# Patient Record
Sex: MALE | Race: WHITE
[De-identification: names, ages, dates, MRNs, and addresses within clinical notes are randomized per-mention and may not be internally consistent; named-entity substitution may affect disease eponyms.]

---

## 2017-12-02 NOTE — EDPHY
H & P


Stated Complaint: Full Trauma, Fall


Time Seen by Provider: 12/02/17 22:52


HPI/ROS: 





Chief Complaint:  Fall, unresponsive





HPI:  22-year-old presumably intoxicated male was witnessed to trip and fall 

off of a 3 ft retaining wall, landed forward on his head on the concrete.  EMS 

was called.  On arrival the patient was unresponsive.  He was not responding to 

noxious stimuli.  Bystanders stated that he had been drinking alcohol.  No 

obvious head trauma per EMS.  Patient had normal heart rate and vital signs per 

EMS and was breathing on his own.  Remainder of history is unavailable 

secondary to the patient's decreased responsiveness.





ROS:  Unavailable secondary to the patient's decreased responsiveness.





PMH:  Unknown





Social History:  Unknown


Family History: non-contributory





Physical Exam:


Gen:  Unresponsive, Airway Intact, patient is localizing to pain, making 

incomprehensible sounds, smells of alcohol, GCS 7


HEENT:


     Head: Atraumatic


     Eyes:  Pupils 6-4 bilaterally, equally reactive in brisk


     Ears: No hemotympanum


     Nose: No epistaxis


     Mouth: Normal dentition, Airway patent


     Face: No deformity


Neck: non-tender, no stepoff


Chest:  non-tender, lungs CTA


Heart: normal heart tones


Abd: soft, abrasion over the left iliac crest


Pelvis: stable to AP and Lateral compression


Back: atraumatic, no midline tenderness


Ext: atramatic, full ROM


Skin: no rash


Neuro:  Moving all extremities

















- Personal History


Current Tetanus/Diphtheria Vaccine: Unsure


Current Tetanus Diphtheria and Acellular Pertussis (TDAP): Unsure





- Medical/Surgical History


Other PMH: unobtainable.





- Social History


Smoking Status: Unknown if ever smoked


Constitutional: 


 Initial Vital Signs











Temperature (C)  36.4 C   12/02/17 22:41


 


Heart Rate  101 H  12/02/17 22:41


 


Respiratory Rate  22 H  12/02/17 22:41


 


Blood Pressure  110/82 H  12/02/17 22:41


 


O2 Sat (%)  100   12/02/17 22:41








 











O2 Delivery Mode               Non-Rebreather Mask


 


O2 (L/minute)                  15














Allergies/Adverse Reactions: 


 





Unable to Assess Allergy (Unverified 02/02/16 15:59)


 








Home Medications: 














 Medication  Instructions  Recorded


 


Unobtainable  02/02/16














Medical Decision Making





- Diagnostics


EKG Interpretation: 





ECG time 11:37 p.m., sinus rhythm with a rate of 87, normal axis, there is J-

point elevation diffusely consistent with early repolarization.  Impression:  

Normal ECG.


Imaging Results: 


 Imaging Impressions





Cervical Spine CT  12/02/17 22:43


Impression: Negative noncontrast CT of the cervical spine for acute traumatic 

injury.


 


Results reviewed at the console at the time of the study..








Head CT  12/02/17 22:43


Impression:   Small focus of parenchymal hemorrhage involving the high right 

parietal subcortical white matter. 


 


Results reviewed directly at the time of the scan.








Abdomen CT  12/02/17 22:46


Impression:  No acute posttraumatic findings in the chest abdomen and pelvis. 


 


Examination reviewed at the console the time of study acquisition.


 


 


 








Chest CT  12/02/17 22:46


Impression:  No acute posttraumatic findings in the chest abdomen and pelvis. 


 


Examination reviewed at the console the time of study acquisition.


 


 


 











Procedures: 





Procedure:  Trauma ultrasound.





Limited echocardiogram for pericardial effusion.


Limited bedside ultrasound was performed and interpreted by myself for the 

indication of:  thoracoabdominal trauma utilizing the thoracoabdominal 

emergency ultrasound protocol.


Limited transthoracic echocardiogram:  The pericardium was visualized and found 

to be negative for pericardial fluid.


The study was negative for pericardial effusion.


Limited abdominal ultrasound for blunt abdominal trauma.


1) The right upper quadrant was visualized and was found to be negative for 

intraperitoneal fluid.


2) The left upper quadrant was visualized and found to be negative for 

intraperitoneal fluid.


The study was felt to be negative for free intraperitoneal fluid.





Limited pelvic ultrasound was conducted for abdominal trauma.


The bladder was visualized and did not reveal an anechoic area outside of the 

adjacent urinary bladder.


The study was felt to be negative for free intraperitoneal fluid.





Limited transthoracic ultrasound for PE shows the bilateral normal string of 

pearls sign with normal a.m. mode bilaterally, no evidence of pneumothorax.


ED Course/Re-evaluation: 





Patient arrived as a full trauma team activation.  No overt initial vital signs 

were unremarkable.  No obvious signs of head trauma.  Pupils reactive.  Patient 

smelled of alcohol.  Dr. Sim present at the bedside on patient arrival.  

Patient underwent primary and secondary survey and E fast scan.  Then proceeded 

directly to CT scanning.





CT scan of the head noted for a right parenchymal bleed.





Patient's blood alcohol 320.





Dr. Sim has arranged for transfer to [facility] for further care.  There 

are no ICU beds available in this hospital at this time.





- Data Points


Laboratory Results: 


 Laboratory Results





 12/02/17 22:47 





 12/02/17 22:47 





 











  12/02/17 12/02/17 12/02/17





  22:47 22:47 22:47


 


WBC      





    


 


RBC      





    


 


Hgb      





    


 


POC Hgb      





    


 


Hct      





    


 


POC Hct      





    


 


MCV      





    


 


MCH      





    


 


MCHC      





    


 


RDW      





    


 


Plt Count      





    


 


MPV      





    


 


Neut % (Auto)      





    


 


Lymph % (Auto)      





    


 


Mono % (Auto)      





    


 


Eos % (Auto)      





    


 


Baso % (Auto)      





    


 


Nucleat RBC Rel Count      





    


 


Absolute Neuts (auto)      





    


 


Absolute Lymphs (auto)      





    


 


Absolute Monos (auto)      





    


 


Absolute Eos (auto)      





    


 


Absolute Basos (auto)      





    


 


Absolute Nucleated RBC      





    


 


Immature Gran %      





    


 


Immature Gran #      





    


 


PT      12.5 SEC SEC





     (12.0-15.0) 


 


INR      0.91 





     (0.83-1.16) 


 


APTT      25.5 SEC SEC





     (23.0-38.0) 


 


POC Sodium      





    


 


Sodium    144 mEq/L mEq/L  





    (134-144)  


 


POC Potassium      





    


 


Potassium    3.8 mEq/L mEq/L  





    (3.5-5.2)  


 


POC Chloride      





    


 


Chloride    105 mEq/L mEq/L  





    ()  


 


Carbon Dioxide    24 mEq/l mEq/l  





    (22-31)  


 


Anion Gap    15 mEq/L mEq/L  





    (8-16)  


 


POC BUN      





    


 


BUN    17 mg/dL mg/dL  





    (7-23)  


 


Creatinine    1.0 mg/dL mg/dL  





    (0.7-1.3)  


 


POC Creatinine      





    


 


Estimated GFR    > 60   





    


 


Glucose    97 mg/dL mg/dL  





    ()  


 


POC Glucose      





    


 


Calcium    8.9 mg/dL mg/dL  





    (8.5-10.4)  


 


Ethyl Alcohol    328 mg/dL H mg/dL  





    (0-10)  


 


Patient ABO/Rh  A POSITIVE     





    


 


Antibody Screen  NEGATIVE     





    














  12/02/17 12/02/17





  22:47 22:42


 


WBC  5.42 10^3/uL 10^3/uL  





   (3.80-9.50)  


 


RBC  4.57 10^6/uL 10^6/uL  





   (4.40-6.38)  


 


Hgb  14.3 g/dL g/dL  





   (13.7-17.5)  


 


POC Hgb    15.3 gm/dL gm/dL





    (13.7-17.5) 


 


Hct  40.8 % %  





   (40.0-51.0)  


 


POC Hct    45 % %





    (40-51) 


 


MCV  89.3 fL fL  





   (81.5-99.8)  


 


MCH  31.3 pg pg  





   (27.9-34.1)  


 


MCHC  35.0 g/dL g/dL  





   (32.4-36.7)  


 


RDW  12.7 % %  





   (11.5-15.2)  


 


Plt Count  211 10^3/uL 10^3/uL  





   (150-400)  


 


MPV  9.9 fL fL  





   (8.7-11.7)  


 


Neut % (Auto)  51.3 % %  





   (39.3-74.2)  


 


Lymph % (Auto)  37.3 % %  





   (15.0-45.0)  


 


Mono % (Auto)  9.4 % %  





   (4.5-13.0)  


 


Eos % (Auto)  1.1 % %  





   (0.6-7.6)  


 


Baso % (Auto)  0.7 % %  





   (0.3-1.7)  


 


Nucleat RBC Rel Count  0.0 % %  





   (0.0-0.2)  


 


Absolute Neuts (auto)  2.78 10^3/uL 10^3/uL  





   (1.70-6.50)  


 


Absolute Lymphs (auto)  2.02 10^3/uL 10^3/uL  





   (1.00-3.00)  


 


Absolute Monos (auto)  0.51 10^3/uL 10^3/uL  





   (0.30-0.80)  


 


Absolute Eos (auto)  0.06 10^3/uL 10^3/uL  





   (0.03-0.40)  


 


Absolute Basos (auto)  0.04 10^3/uL 10^3/uL  





   (0.02-0.10)  


 


Absolute Nucleated RBC  0.00 10^3/uL 10^3/uL  





   (0-0.01)  


 


Immature Gran %  0.2 % %  





   (0.0-1.1)  


 


Immature Gran #  0.01 10^3/uL 10^3/uL  





   (0.00-0.10)  


 


PT    





   


 


INR    





   


 


APTT    





   


 


POC Sodium    142 mEq/L mEq/L





    (134-144) 


 


Sodium    





   


 


POC Potassium    3.5 mEq/L mEq/L





    (3.3-5.0) 


 


Potassium    





   


 


POC Chloride    105 mEq/L mEq/L





    () 


 


Chloride    





   


 


Carbon Dioxide    





   


 


Anion Gap    





   


 


POC BUN    16 mg/dL mg/dL





    (7-23) 


 


BUN    





   


 


Creatinine    





   


 


POC Creatinine    1.5 mg/dL H mg/dL





    (0.7-1.3) 


 


Estimated GFR    





   


 


Glucose    





   


 


POC Glucose    99 mg/dL mg/dL





    () 


 


Calcium    





   


 


Ethyl Alcohol    





   


 


Patient ABO/Rh    





   


 


Antibody Screen    





   











Medications Given: 


 








Discontinued Medications





Sodium Chloride (Ns)  1,000 mls @ 0 mls/hr IV ONCE ONE; Wide Open


   PRN Reason: Protocol


   Stop: 12/02/17 23:00


   Last Admin: 12/02/17 23:33 Dose:  1,000 mls


Ondansetron HCl (Zofran)  4 mg IVP EDNOW ONE


   Stop: 12/02/17 23:00


   Last Admin: 12/02/17 22:48 Dose:  4 mg





Point of Care Test Results: 


 











  12/02/17





  22:42


 


POC Sodium  142


 


POC Potassium  3.5


 


POC Chloride  105


 


POC BUN  16


 


POC Creatinine  1.5 H


 


POC Glucose  99














Departure





- Departure


Disposition: Acute Care [facility]


Clinical Impression: 


 Intraparenchymal hemorrhage of brain, Alcohol intoxication





Condition: Serious


Referrals: 


Patient,NotPresent [Primary Care Provider] - As per Instructions

## 2017-12-02 NOTE — CPEKG
Heart Rate: 87

RR Interval: 690

P-R Interval: 208

QRSD Interval: 88

QT Interval: 376

QTC Interval: 453

P Axis: 70

QRS Axis: 51

T Wave Axis: 57

EKG Severity - NORMAL ECG -

EKG Impression: SINUS RHYTHM

EKG Impression: ST ELEV, PROBABLE NORMAL EARLY REPOL PATTERN

Electronically Signed By: Aaron Rae 03-Dec-2017 06:29:39

## 2017-12-03 NOTE — GDS
[f 
rep st]



                                                              TRANSFER SUMMARY





HISTORY AND PHYSICAL AND TRANSFER SUMMARY



HISTORY:  The patient is a 22-year-old male who was running and apparently fell 
over 3 foot wall landing on a cement walkway.  He was unresponsive at the scene
, and transported by EMS.  He did not have any vomiting.  On admission, he was 
met in the emergency room by Dr. Rae and Dr. Sim.



PHYSICAL EXAMINATION:  HEENT:  Evaluation of his head and neck first shows that 
his airway is clear, his breathing is unencumbered, and there is no external 
bleeding.  Specifically, regarding his head, his skull is palpably normal.  
There are no raccoon eyes.  There is no Cortes sign.  His jaw appears to be 
stable.  NECK:  He is in a C-collar and he is left in a C-collar due to his 
mental status.  UPPER EXTREMITIES:  His right upper extremity is ranged and 
found to be unremarkable.  His left upper extremity is ranged and found to be 
unremarkable.  His clavicles are palpably normal.  His chest is stable to AP 
and lateral compression.  Breath sounds are equal bilaterally.  CARDIAC:  Shows 
S1, S2 to be normal with normal split of S2, without murmurs, rubs, or gallops.
  ABDOMEN:  Soft, nontender.  There is an umbilical hernia noted.  There is an 
abrasion on his left flank.  His pelvis is stable to AP and lateral 
compression.  LOWER EXTREMITIES:  Ranged and found to be unremarkable.  VITAL 
SIGNS:  On admission, were 110/82, heart rate of 99, respirations 20, 
temperature 36.2.



MEDICATIONS:  Received have been limited to Zofran.



IMAGING:  He was taken to CAT scan.  In CAT scan, the CAT scan of his head 
shows a minimal skin thickening consistent with a contusion.  It shows a small 
intraparenchymal linear bleed in the right posterior parietal/occipital area.  
There is no evidence of shift.  There is no evidence of epidural or subdural 
bleed.  There is a suggestion of a small subarachnoid bleed near the brainstem. 



CT of his neck is unremarkable. 



CT of his chest shows no great vessel injury.  No pneumothorax.  No pulmonary 
contusion.  No hemo or hydrothorax. 



Note is made he did have an extended FAST examination in the ER, which did show 
a full bladder, a normal right kidney with no evidence of fluid in Morison's 
pouch.  Normal left kidney with no fluid between the spleen and the kidney.  
The cardiac exam showed no evidence of pericardial effusion and good 
contraction.  The pulmonary examination did not show any signs of pneumothorax.
  The CAT scan of the abdomen confirmed the above findings.  The CAT scan of 
the pelvis was similarly negative.



LABORATORY DATA:  His coagulation parameters are normal.  His blood alcohol is 
320, as mentioned above.  His hematocrit is 40.8, platelet count is 211.  His 
INR is 0.91.  His chemistries reveal sodium 144, potassium of 3.8, BUN of 17 
and a creatinine of 1.0.  His calcium is 8.9.



HOSPITAL COURSE:  Unfortunately, there are no available ICU beds at this 
facility.  I have contacted [facility] and talked with Dr. Raul Sen, who 
is the trauma surgeon on call, and she will accept the patient in transfer.  He 
is not intubated at this point and appears to be protecting his airway.  Since 
admission, he has become a little more arousable.  He did move his right upper 
extremity for localizing pain (pinch of his left trapezius), but that is the 
only movement we have seen.  An ABG will be obtained to make sure that his 
ventilation is appropriate. A Otto catheter hasd been placed and 300 cc of 
clear yellow urine had been obtained.



PLAN:  At this point, I do not see indication to intubate.  He will be sent by 
critical care ambulance to [facility].





Job #:  [id number]

MTDD

## 2017-12-06 NOTE — ASMTCMCOM
CM Note

 

CM Note                       

Notes:

Case Management asked to assist with making follow up appointment with [facility].  Appointment 


made with Dr. Bolanos at the [place] office for Friday, December 8th at 0915 AM.  I informed patient 

of this appointment and called patient on his cell phone [phone] asking him if I could leave 

a VM with details/address, etc for appointment.  Message left with details as well as CM phone 

number for any other follow up questions/concerns.

 

Date Signed:  12/06/2017 12:04 PM

Electronically Signed By:Floridalma Mccormick RN

## 2017-12-06 NOTE — EDPHY
H & P


Time Seen by Provider: 12/06/17 10:12


HPI/ROS: 





CHIEF COMPLAINT:  Ramos catheter problem





HISTORY OF PRESENT ILLNESS:  22-year-old male presents with problem with his 

Ramos catheter.  He was seen in this emergency department on 12/2/2017 after a 

closed head injury.  He apparently fell off a 3 ft wall and was found 

unresponsive.  During his emergency department evaluation, a Ramos catheter was 

placed.  Apparently the Ramos catheter was inflated within the urethra.  He was 

transferred to [facility] because of unavailability of ICU beds.  He was 

seen by a urologist at [facility].  He continues to have a Ramos catheter in 

place and the plan is for the Ramos catheter to be removed over the weekend.  

The Ramos catheter is pulling on his penis and is also frequently becoming 

disconnected at the junction with his collection bag.  He denies suprapubic 

pain.





REVIEW OF SYSTEMS:


Constitutional:  No fever, no chills


Eyes:  No visual changes


ENT:  No sore throat


Respiratory:  No cough, no shortness of breath


Cardiac:  No chest pain


Gastrointestinal:  No nausea, no vomiting, no abdominal pain


Genitourinary:  no dysuria


Musculoskeletal:  No leg pain or swelling


Skin:  No rash


Neurological:  No headache


Psychiatric:  No anxiety





Past Medical/Surgical History: 





Urethral injury


Alcohol abuse





Social History: 





[employer] student





Smoking Status: Never smoked


Physical Exam: 





General Appearance:  Alert, pleasant


Eyes:  Pupils equal and round, no conjunctival pallor


ENT, Mouth:  Mucous membranes moist


Neck:  Normal inspection


Respiratory:  Lungs are clear to auscultation


Cardiovascular:  Regular rate and rhythm


Gastrointestinal:  Abdomen is soft and nontender


Genitourinary:  Ramos catheter in place, draining yellow urine


Neurological:  A&O, nonfocal, normal gait


Skin:  Warm and dry


Extremities:  normal inspection


Psychiatric:  Mood and affect normal





Constitutional: 


 Initial Vital Signs











Temperature (C)  36.7 C   12/06/17 09:51


 


Heart Rate  106 H  12/06/17 09:51


 


Respiratory Rate  16   12/06/17 09:51


 


Blood Pressure  105/69   12/06/17 09:51


 


O2 Sat (%)  93   12/06/17 09:51








 











O2 Delivery Mode               Room Air














Allergies/Adverse Reactions: 


 





No Known Allergies Allergy (Unverified 12/06/17 09:51)


 








Home Medications: 














 Medication  Instructions  Recorded


 


NK [No Known Home Meds]  12/06/17














Medical Decision Making


ED Course/Re-evaluation: 





The ramos catheter was adjusted by the ED RN.  Dr. Quintero consulted, pt will f/u 

in the office on Friday.








Departure





- Departure


Disposition: Home, Routine, Self-Care


Clinical Impression: 


Ramos catheter problem


Qualifiers:


 Encounter type: initial encounter Qualified Code(s): T83.9XXA - Unspecified 

complication of genitourinary prosthetic device, implant and graft, initial 

encounter





Condition: Good


Instructions:  Ramos Catheter Placement and Care (ED)


Additional Instructions: 


I spoke with Dr. Quintero, the urologist on call.  Follow up in the office on 

Friday.





You have an appointment with Dr. Bolanos at [facility] in [place] on Friday, December 8th at 9:15 AM





[facility]


[address] [address] [phone]





Please bring your insurance card and photo ID to your appointment





Referrals: 


Antione Quintero MD [Medical Doctor] - As per Instructions

## 2017-12-07 NOTE — EDPHY
H & P


Stated Complaint: bleeding around ramos catheter/seen yesterday for same


Time Seen by Provider: 12/07/17 18:40


HPI/ROS: 





Chief complaint:  Penis pain, bleeding from Ramos insertion





History of present illness:  This is a 22-year-old male who returns to the 

emergency room reporting pain in his penis from a Ramos insertion.  Patient was 

brought to this hospital approximately a week ago as a full trauma activation.  

He ultimately was diagnosed with an intracranial bleed and transferred to 

[facility].  When he was carline to the ED for the trauma a Ramos catheter 

was inserted and the balloon was inflated before was Ramos was all the way into 

the bladder causing some urethral damage. It is my understanding that while at 

[facility] that catheter was removed and a new 1 was placed under cystoscopy 

and left in place to allow the injury to the urethra to heal.  Patient was seen 

in this emergency department yesterday for the same problem.  His leg bag was 

replaced and he felt better.  He states again he is having pain in the penis 

region.  Some bleeding coming from the urethral meatus. He reports the 

sensation that he is not emptying his bladder. No new signs or symptoms 

including no fevers, no back or flank pain, no pain or swelling in the 

testicles.  He is scheduled to see Urology tomorrow morning at 9:15 a.m..





- Personal History


Current Tetanus/Diphtheria Vaccine: No





- Medical/Surgical History


Hx Asthma: Yes


Hx Chronic Respiratory Disease: No


Hx Diabetes: No


Hx Cardiac Disease: No


Hx Renal Disease: No


Hx Cirrhosis: No


Hx Alcoholism: Yes


Hx HIV/AIDS: No


Hx Splenectomy or Spleen Trauma: No


Other PMH: head trauma, L ankle surgery, L elbow fx





- Social History


Smoking Status: Never smoked





- Physical Exam


Exam: 





General Appearance: Alert, nontoxic


Genitourinary:  Ramos catheter is in place.  Dry blood at the urethral meatus.  

There is clear urine flowing through the catheter.


Abdomen:  Bowel sounds normal.  Abdomen soft, nondistended, nontender.


Skin:  No rashes or lesions.





Constitutional: 


 Initial Vital Signs











Temperature (C)  36.5 C   12/07/17 18:02


 


Heart Rate  95   12/07/17 18:02


 


Respiratory Rate  18   12/07/17 18:02


 


Blood Pressure  128/71 H  12/07/17 18:02


 


O2 Sat (%)  98   12/07/17 18:02








 











O2 Delivery Mode               Room Air














Allergies/Adverse Reactions: 


 





No Known Allergies Allergy (Verified 12/07/17 18:02)


 








Home Medications: 














 Medication  Instructions  Recorded


 


NK [No Known Home Meds]  12/06/17














Medical Decision Making


ED Course/Re-evaluation: 





Patient is discussed with my secondary supervising physician Dr. Mariusz Elizalde.  

Patient presents to the emergency department complaining of pain in his penis 

from a Ramos catheter and sensation that he is not emptying his bladder.  

Bladder scanner is performed without urine noted in the bladder.  Ramos 

catheter bag is changed although Ramos catheter is left in place.  Patient 

states he is feeling better.  Continues to have urine flow in catheter tubing.  

He is concerned that pain will return.  He is given a 1 time dose of Percocet.  

He is scheduled to see Urology tomorrow morning.  I have discussed the 

importance of keeping this appointment.  Return precautions are given.  The 

patient and his mother voiced understanding and agreement with plan.





- Data Points


Medications Given: 


 








Discontinued Medications





Oxycodone/Acetaminophen (Percocet 5/325)  2 tab PO EDNOW ONE


   Stop: 12/07/17 19:20


   Last Admin: 12/07/17 19:37 Dose:  2 tab








Departure





- Departure


Disposition: Home, Routine, Self-Care


Clinical Impression: 


Ramos catheter problem


Qualifiers:


 Encounter type: subsequent encounter Qualified Code(s): T83.9XXD - Unspecified 

complication of genitourinary prosthetic device, implant and graft, subsequent 

encounter





Condition: Good


Instructions:  Ramos Catheter Placement and Care (ED)


Additional Instructions: 


Follow-up with your urologist tomorrow morning at 9:15 a.m. as arranged





If symptoms worsen or new symptoms develop return to the emergency room for 

recheck


Referrals: 


MIKKI OATES [Primary Care Provider] - As per Instructions

## 2018-09-21 ENCOUNTER — HOSPITAL ENCOUNTER (INPATIENT)
Dept: HOSPITAL 80 - FED | Age: 23
LOS: 3 days | Discharge: HOME | DRG: 897 | End: 2018-09-24
Attending: PSYCHIATRY & NEUROLOGY | Admitting: PSYCHIATRY & NEUROLOGY
Payer: COMMERCIAL

## 2018-09-21 DIAGNOSIS — F10.129: Primary | ICD-10-CM

## 2018-09-21 DIAGNOSIS — F14.959: ICD-10-CM

## 2018-09-21 DIAGNOSIS — F12.959: ICD-10-CM

## 2018-09-21 DIAGNOSIS — F41.8: ICD-10-CM
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
## 2018-09-21 LAB — PLATELET # BLD: 221 10^3/UL (ref 150–400)

## 2018-09-21 PROCEDURE — G0480 DRUG TEST DEF 1-7 CLASSES: HCPCS
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
## 2018-09-21 NOTE — ASMTTCLDSP
[facility] Discharge Disposition

 

Disposition:                  Answers:  Admit                                 

Disposition Notes:            

Notes:

In consultation with [facility] ED physician, Jill Gautam MD and on-call psychiatrist, Candido Ortiz MD, both concurred that pt appears to meet 27-65 criteria requiring psychiatric 

hospitalization as pt appears to be at risk of harm to self due to a mental illness condition. Pt 

was given the [facility] prohibited belongings list while in the ED.

Was patient given the         Answers:  Yes                                   

Inpatient Behavioral                                                          

Health Prohibited                                                             

Belongings List while in                                                      

the ED?                                                                       

For inpatient                 Candido Ortiz

admission, the following      

psychiatrist agreed to        

accept patient for            

admission to Behavioral       

Health ([facility]):              

Type of Hold:                 Answers:  M1/72-hour Hold                       

Hold initiated by:            Answers:  ED Physician                          

 

Date Signed:  09/21/2018 11:46 AM

Electronically Signed By:Corine Chino

## 2018-09-21 NOTE — EDPHY
H & P


Time Seen by Provider: 09/21/18 02:57


HPI/ROS: 





Chief Complaint:  Suicidal ideation, alcohol intoxication





HPI:  23-year-old male bar in by police on a mental health hold.  Per police 

the patient called his mother this morning intoxicated and told her that he was 

going to commit suicide today.  He did not provide her with a plan.  He does 

have a history of a possible suicide attempt in the past.  He sustained a fall 

off of a bridge.  Per police report that was a prior suicide attempt.  Patient 

states that he simply fell.  He is currently denying thoughts of suicide.  He is

, however, uncooperative with providing history and exam.  He is slurring his 

words and is clearly intoxicated.





ROS:  10 systems were reviewed and were negative except those elements noted in 

the HPI.





PMH:  Denies





Social History: No smoking, occasional alcohol,  no recreational drug use





Family History: non-contributory





Physical Exam:


Gen: Awake, Alert, slurred speech


HEENT:  


     Nose: no rhinorrhea


     Eyes: PERRLA, EOMI


     Mouth: Moist mucosa 


Neck: Supple, no JVD


Chest: nontender, lungs clear to auscultation


Heart: S1, S2 normal, no murmur


Abd: Soft, non-tender, no guarding


Back: no CVA tenderness, no midline tenderness 


Ext: no edema, non-tender


Skin: no rash


Neuro: CN II-XII intact, Sensation grossly intact, Strength 5/5 in bilateral 

upper and lower extremities


 (Aaron Rae)


Constitutional: 


 Initial Vital Signs











Temperature (C)  36.7 C   09/21/18 03:01


 


Heart Rate  74   09/21/18 03:01


 


Respiratory Rate  16   09/21/18 03:01


 


Blood Pressure  124/73 H  09/21/18 03:01


 


O2 Sat (%)  96   09/21/18 03:01








 











O2 Delivery Mode               Room Air














Allergies/Adverse Reactions: 


 





No Known Allergies Allergy (Unverified 09/21/18 03:01)


 








Home Medications: 














 Medication  Instructions  Recorded


 


NK [No Known Home Meds]  09/21/18














Medical Decision Making


ED Course/Re-evaluation: 





Patient's blood alcohol 261.  Will need to wait for clearance of his alcohol 

for mental health evaluation.





0700  patient signed out to Dr. Thurman pending mental health evaluation. (Aaron Rae)





7:00 a.m.-I assumed care of this patient at shift change.  Awaiting mental 

health evaluation.


9:45 a.m.-admitted to [address] [place] [address] by Dr. Rivera. (Minoo Gautam)


Differential Diagnosis: 





Differential diagnosis includes though it is not limited to suicidal ideation, 

overdose, acute psychosis, self-injury, alcohol withdrawal. (Minoo Gautam)





- Data Points


Laboratory Results: 


 Laboratory Results





 09/21/18 03:05 





 09/21/18 03:05 





 











  09/21/18 09/21/18 09/21/18





  03:05 03:05 03:05


 


WBC      6.05 10^3/uL 10^3/uL





     (3.80-9.50) 


 


RBC      5.11 10^6/uL 10^6/uL





     (4.40-6.38) 


 


Hgb      15.7 g/dL g/dL





     (13.7-17.5) 


 


Hct      45.5 % %





     (40.0-51.0) 


 


MCV      89.0 fL fL





     (81.5-99.8) 


 


MCH      30.7 pg pg





     (27.9-34.1) 


 


MCHC      34.5 g/dL g/dL





     (32.4-36.7) 


 


RDW      13.3 % %





     (11.5-15.2) 


 


Plt Count      221 10^3/uL 10^3/uL





     (150-400) 


 


MPV      10.1 fL fL





     (8.7-11.7) 


 


Neut % (Auto)      60.6 % %





     (39.3-74.2) 


 


Lymph % (Auto)      31.6 % %





     (15.0-45.0) 


 


Mono % (Auto)      5.8 % %





     (4.5-13.0) 


 


Eos % (Auto)      0.8 % %





     (0.6-7.6) 


 


Baso % (Auto)      1.0 % %





     (0.3-1.7) 


 


Nucleat RBC Rel Count      0.0 % %





     (0.0-0.2) 


 


Absolute Neuts (auto)      3.67 10^3/uL 10^3/uL





     (1.70-6.50) 


 


Absolute Lymphs (auto)      1.91 10^3/uL 10^3/uL





     (1.00-3.00) 


 


Absolute Monos (auto)      0.35 10^3/uL 10^3/uL





     (0.30-0.80) 


 


Absolute Eos (auto)      0.05 10^3/uL 10^3/uL





     (0.03-0.40) 


 


Absolute Basos (auto)      0.06 10^3/uL 10^3/uL





     (0.02-0.10) 


 


Absolute Nucleated RBC      0.00 10^3/uL 10^3/uL





     (0-0.01) 


 


Immature Gran %      0.2 % %





     (0.0-1.1) 


 


Immature Gran #      0.01 10^3/uL 10^3/uL





     (0.00-0.10) 


 


Sodium    148 mEq/L H mEq/L  





    (135-145)  


 


Potassium    3.7 mEq/L mEq/L  





    (3.3-5.0)  


 


Chloride    115 mEq/L H mEq/L  





    ()  


 


Carbon Dioxide    18 mEq/l L mEq/l  





    (22-31)  


 


Anion Gap    15 mEq/L mEq/L  





    (8-16)  


 


BUN    10 mg/dL mg/dL  





    (7-23)  


 


Creatinine    0.8 mg/dL mg/dL  





    (0.7-1.3)  


 


Estimated GFR    > 60   





    


 


Glucose    137 mg/dL H mg/dL  





    ()  


 


Calcium    9.6 mg/dL mg/dL  





    (8.5-10.4)  


 


Urine Opiates Screen  NEGATIVE     





   (NEGATIVE)   


 


Urine Barbiturates  NEGATIVE     





   (NEGATIVE)   


 


Ur Phencyclidine Scrn  NEGATIVE     





   (NEGATIVE)   


 


Ur Amphetamine Screen  NEGATIVE     





   (NEGATIVE)   


 


U Benzodiazepines Scrn  NEGATIVE     





   (NEGATIVE)   


 


Urine Cocaine Screen  NEGATIVE     





   (NEGATIVE)   


 


U Marijuana (THC) Screen  NON-NEGATIVE  H     





   (NEGATIVE)   


 


Ethyl Alcohol    261 mg/dL H mg/dL  





    (0-10)  














Departure





- Departure


Disposition: [facility]


Clinical Impression: 


 Suicidal ideation





Alcohol intoxication


Qualifiers:


 Complication of substance-induced condition: uncomplicated Qualified Code(s): 

F10.920 - Alcohol use, unspecified with intoxication, uncomplicated





Condition: Good


Instructions:  Abuse of Alcohol (ED), Suicide Prevention (ED)


Additional Instructions: 


Follow-up with mental health as suggested.


Return with any concerns.





Referrals: 


[facility] [Outside] - As per Instructions

## 2018-09-22 RX ADMIN — FOLIC ACID SCH MG: 1 TABLET ORAL at 09:15

## 2018-09-22 RX ADMIN — THERA TABS SCH EACH: TAB at 09:14

## 2018-09-22 RX ADMIN — Medication SCH MG: at 09:14

## 2018-09-22 RX ADMIN — PROMETHAZINE HYDROCHLORIDE PRN MG: 25 TABLET ORAL at 22:11

## 2018-09-22 RX ADMIN — PROMETHAZINE HYDROCHLORIDE PRN MG: 25 TABLET ORAL at 16:30
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
## 2018-09-22 NOTE — ASMTBHMTP
Master Treatment Plan

 

Master Treatment Plan         Answers:  Depressed Mood without                

for:                                    Suicidal Ideation                     

Date:                         09/22/2018

Diagnosis on Admission:       Unspecified Anxiety Disorder

Expected length of stay:      3-5 Days

Reason for admission:         

Notes:

Per [facility] Evaluation - Pt. is a 23 year old, single,  male who was brought to the [facility] ED on 

a M1 hold. Police were called by out of state mother this am since pt had told his mother in an 

intoxicated state that he was going to kill himself. Pt. may of had a suspected suicide attempt in 

the past when he fell off a ledge. Per police report this was a prior suicide attempt. Pt. had 

stated the incident was simply a fall. Pt. presented with slurred speech in an intoxicated 

state. Per M1 hold pt was brought in by [employer] after receiving a phone call from his mother. She had 

stated on phone call that patient spoke with her on the phone this morning and stated he would kill 


himself today. Pt. is currently intoxicated and argumentative. Not alison for safety. Pt. was 

placed on a M1 hold by ED Physician. 

Patient's stated              

presenting problems:          

Notes:

My mom wanted a wellness check over a misunderstanding. 

Patient's goals for           

treatment:                    

Notes:

Absorb information given

Patient's strengths:          

Notes:

Good writer

Identify supports outside     

of hospital:                  

Notes:

Mom and friend Kwab

Discharge criteria:           

Notes:

Suicidal ideation will resolve and patient will have a plan to safely manage recurrent suicidal 

ideation. 

Initial disposition           

plan/considerations:          

Notes:

Return to school at 

Master Treatment Plan Required Signatures

 

Psychiatrist signature:       Answers:  Psychiatrist: ______________________________

RN on-shift signature:        Answers:  RN: ____________________________________

Patient signature:            Answers:  Patient: ____________________________________

 

Date Signed:  09/22/2018 09:58 AM

Electronically Signed By:Naya Andrews
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
## 2018-09-23 RX ADMIN — PROMETHAZINE HYDROCHLORIDE PRN MG: 25 TABLET ORAL at 12:34

## 2018-09-23 RX ADMIN — PROMETHAZINE HYDROCHLORIDE PRN MG: 25 TABLET ORAL at 04:27

## 2018-09-23 RX ADMIN — THERA TABS SCH EACH: TAB at 08:23

## 2018-09-23 RX ADMIN — PROMETHAZINE HYDROCHLORIDE PRN MG: 25 TABLET ORAL at 21:37

## 2018-09-23 RX ADMIN — Medication SCH MG: at 08:23

## 2018-09-23 RX ADMIN — PROMETHAZINE HYDROCHLORIDE PRN MG: 25 TABLET ORAL at 16:57

## 2018-09-23 RX ADMIN — FOLIC ACID SCH MG: 1 TABLET ORAL at 08:22

## 2018-09-23 NOTE — SOAPPROG
SOAP Progress Note


Assessment/Plan: 


Assessment:








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 

as well as cannabis use disorder. 








Plan:





09/23/18 16:24


1. Patient continues to deny feeling sad, depressed, or suicidal. He denies any 

thought, plan or intent to hurt himself or others.


2. CC provided patient with information about SA IOP at [facility] and [facility]. 

Patient agreed to f/u with tx at one of these programs. He said he was doing it 

b/c his MOC "wants me to." 


3. Patient plans to go stay with cousins in [place] after d/c. 


4. Patient was also given referrals to BC/BS providers for therapy. 


5. Patient scored < 4 on CIWA, denies any w/d sxs. He only scored for anxiety 

which he says is d/t "being here." 


6. Likely to d/c tomorrow once [facility] expires. 


Subjective: 


Patient denies any w/d sxs. He has only scored on CIWA for anxiety which he 

says is d/t being in hospital. He is eager to leave tomorrow. He completed 

safety plan for discharge and agreed to f/u with IOP at [facility] or [facility] for 

substance abuse. He said he is only doing it b/c his MOC insists. 





Objective: 





 Vital Signs











Temp Pulse Resp BP Pulse Ox


 


 36.8 C   93   16   121/76 H  95 


 


 09/23/18 12:00  09/23/18 12:00  09/23/18 12:00  09/23/18 12:00  09/23/18 12:00








MSE: Affect: Euthymic  Mood: "Good"  TP: Linear  TC: Denies SI/HI  Insight/

Judgment: Poor a/e/b continued alcohol and THC despite negative consequences





- Time Spent With Patient


Time Spent With Patient: 


15"








- Pending Discharge


Pending Discharge Within 24 Hours: Yes


Pending Discharge Date: 09/24/18 (Likely to d/c tomorrow)


Pending Discharge Time: 11:00





ICD10 Worksheet


Patient Problems: 


 Problems











Problem Status Onset


 


Alcohol intoxication Acute  


 


Suicidal ideation Acute

## 2018-09-23 NOTE — ASMTBHDC
Notes

 

Note:                         

Notes:

Pt. reports being "nervous about interaction with doctor yesterday". Pt. reports completing his 

safety plan and willing to get treatment for substance use. Pt. agreed to attend [facility] upon discharge. Pt. initially stated his mother is "in town", and later stated she was still 

driving through [place]. Pt. stated he can stay with his cousin, Roddy Echeverria ([phone]) until his 

mother arrives in [place]. Pt. denied SI, HI, AVH and paranoia. Pt. reports having less panic 

attacks since being in the hospital. 



Pt. presents as alert, anxious, demanding at times, somewhat entitled, with good eye contact, and 

egar to discharge. Staff report pt. sleeping 6.5 hours, taking PRNs as needed, and attending 

groups. 

 

Date Signed:  09/23/2018 02:37 PM

Electronically Signed By:Naya Andrews

## 2018-09-23 NOTE — ASMTBHDC
Notes

 

Note:                         

Notes:

CC spoke with pt's cousin, Roddy Echeverria ([phone]).

  Roddy stated pt. can stay with him upon discharge until pt's mother arrives in [place]. Roddy 

stated he can pick pt. up around lunchtime or after 3:00pm on Monday. 

 

Date Signed:  09/23/2018 03:05 PM

Electronically Signed By:Naya Andrews

## 2018-09-24 VITALS — DIASTOLIC BLOOD PRESSURE: 75 MMHG | SYSTOLIC BLOOD PRESSURE: 124 MMHG

## 2018-09-24 RX ADMIN — PROMETHAZINE HYDROCHLORIDE PRN MG: 25 TABLET ORAL at 06:50

## 2018-09-24 RX ADMIN — THERA TABS SCH EACH: TAB at 08:49

## 2018-09-24 RX ADMIN — FOLIC ACID SCH MG: 1 TABLET ORAL at 08:49

## 2018-09-24 RX ADMIN — Medication SCH MG: at 08:49

## 2018-09-24 NOTE — BDS
REASON FOR ADMISSION:  From the ED note dated 09/21/2018, patient presented to 
the ED by police on a mental health hold.  Police reported that patient called 
his mother this morning, intoxicated, and told her he was going to commit 
suicide today.  The patient did not provide mother with a plan.  Patient does 
have history of possible suicide attempt in the past.  Patient denied suicide 
in the ED.  Patient was, however, uncooperative with providing history and 
exam.  Patient was slurring his words and was clearly intoxicated.  Patient was 
admitted involuntarily on an M1 hold due to being a danger to himself.  Patient 
was admitted for safety, crisis stabilization, and medication management.



ADMITTING DIAGNOSES:  

1.  Alcohol intoxication.

2.  Suicidal ideation.



ADMISSION PHYSICAL EXAM:  The patient was seen on 09/21/2018, for medical 
evaluation prior to Behavioral Health admission.  The patient was medically 
cleared for inpatient psychiatric hospitalization and treatment. For further 
details, please refer to consultant note dated 09/21/2018.



ADMISSION LABS:  CBC from 09/21/2018, within normal limits.  Chemistry from 09/
21/2018, within normal limits, except sodium was elevated at 148, chloride was 
elevated at 115, carbon dioxide was low at 18.  Glucose was elevated at 137.  
Liver function tests from 09/21/2018, within normal limits, except total 
protein was elevated at 8.3.  Toxicology screen from 09/21/2018, negative for 
all substances of abuse, except was non-negative for THC, was positive for 
ethyl alcohol with a level of 261.



MAJOR PROCEDURES OR TESTS:  None.



HOSPITAL COURSE:  The most prominent symptoms and behaviors while the patient 
was here were moderate anxiety.  Treatment modalities utilized to target 
moderate anxiety included milieu and group therapy.  The patient was placed on 
a [facility] protocol for safety to monitor alcohol withdrawal symptoms.  The patient 
has improved considerably with no signs of psychiatric symptoms and no 
psychiatric symptoms expressed.  The patient has improved since admission.  
States to be in stable condition.  Feels safe to discharge, and he contracts 
for safety.  Patient's response to treatment was good.  There were no adverse 
or unexpected results of treatment.  The patient was safe throughout his stay, 
active in treatment, engaged in groups, and was appropriate with staff and 
other patients.  The patient met with the treatment team prior to discharge to 
assess readiness to discharge and reviewed discharge plan. The treatment team 
consensus is the patient is in stable condition and has a safe discharge plan 
and is ready to discharge today.



CONDITION AT DISCHARGE:  Patient is in stable condition and is no longer a 
danger to self or others, and is not gravely disabled due to mental illness.  
Patient is no longer in need of inpatient level of care, and can be safely and 
effectively treated within the community.  The patients level of risk at time 
of discharge is low.  MSE: The patient is casually dressed and with good hygiene
, and looks stated age.  Patient is sitting, posture is upright, and position 
is relaxed.  Patient appears awake, alert, and responds appropriately and 
reasonably during interview.  Patient is engaged, relates well to interviewer, 
and emotional facial expression is appropriate to situation and changes 
appropriately with topic.  Patient is cooperative, makes comfortable eye contact
, and movements are voluntary, deliberate, coordinated, and smooth and even 
with no inappropriate movements.  Patient makes laryngeal sounds effortlessly 
and shares conversation appropriately; pace of conversation is appropriate, and 
stream of talking is fluent; articulation is clear and understandable; word 
choice is effortless and appropriate for education level; completes sentences, 
occasionally pausing to think; rate and volume are appropriate for interview 
and setting.  Patient reports mood as euthymic.  Patients affect is stable with 
full variable range, congruent with mood, and appropriate to speech and 
circumstances.  Patient has linear and logical thinking, with no loose 
associations, tangential thought, thought blocking, concrete thinking, or any 
other signs of formal thought disorder.  Patient denies suicidal and homicidal 
ideation, and denies hallucinations and delusions.  Patient appears to be a 
reliable historian with sound judgement and good insight into current 
condition.  Patient has no apparent dysfunction in recent or remote memory noted
, and no evidence of gross cognitive dysfunction noted at any point during the 
interview.



DISCHARGE DIAGNOSES:  

1.  Alcohol use disorder, severe.

2.  Cannabis use disorder.



CURRENT MEDICATIONS:  No scheduled medications prescribed at the time of 
discharge.  The patient was encouraged to continue vitamin B1 100 mg daily, 
multivitamin daily, and folic acid 1 mg p.o. daily.



DISPOSITION:  The patient left hospital independently and voluntarily after a 
conference call with his mother.  His mother is on her way to [place] to spend 
time with the patient.  She is currently driving from [place].  Patient was 
picked up by his cousin at time of discharge and plans to remain with his 
cousin until his mother arrives from [place].



FOLLOWUP:  Care coordinator reports the appropriate outpatient follow-up 
services have been established and outpatient appointments have been scheduled.
  The patient received written instructions with times and dates of outpatient 
follow-up appointments.  The following follow-up recommendations were provided 
to the patient at discharge: Continue psychotropic medications as prescribed 
and attend appointments as scheduled.  Report any side effects to a psychiatric 
outpatient provider, a primary care provider, or other health care 
professional.  Address any questions or problems concerning the psychotropic 
medications with a psychiatric outpatient provider, a primary care provider, or 
other health care professional.  Contact [facility], or go 
to the nearest emergency room, if you are ever a danger to yourself/others, or 
unable to care for yourself.  As soon as possible, establish a routine 
medication management treatment with a psychiatric provider, establish routine 
therapy appointments, and follow-up with a primary care provider.  



SUBSTANCE ABUSE BRIEF INTERVENTION: Brief intervention regarding the risks of 
alcohol and cannabis abuse is provided to patient with goal to reduce the risk 
of harm that could result from the continued use of alcohol and cannabis, with 
the general aim to investigate the problem, raise awareness of problem, develop 
a solution with the patient, recommend a specific change or activity, and 
motivate the patient toward change.  Assess substance abuse behavior and give 
supportive advice about harm reduction, recommend a reduction in hazardous/at-
risk consumption patterns, and facilitate referrals for additional specialized 
treatment with care coordinator.  Intermediate goal is for the patient to quit 
use of these substances and engage in OP substance abuse treatment.  
Intervention focus on intermediate goals to allow for more immediate success in 
the treatment process to keep the patient motivated.  Review following with 
patient: Cannabis use risks: Short-term use: impaired short-term memory, 
impaired motor coordination, altered judgement, in high doses paranoia and 
psychosis.  Long-term use addiction, diminished life satisfaction and 
achievement, symptoms of chronic bronchitis, and increased risk of chronic 
psychosis disorders if predisposition to such disorders.  In withdrawal anger, 
aggression irritability, anxiety and nervousness, decreased appetite or weight 
loss, restlessness, and sleep difficulties with strange dreams.  Alcohol/Binge 
Drinking risks: short-term: injuries, violence, alcohol poisoning, risky sexual 
behaviors.  Long-term: high blood pressure, stroke, liver disease, digestive 
problems, cancer, learning and memory problems, depression and anxiety, social 
problems, and alcohol dependence.  



OUTPATIENT SUBSTANCE ABUSE TREATMENT: Patient referred to outpatient provider 
and treatment for continued treatment related to substance abuse.



LEGAL COURSE:  The patient was admitted on an M1 hold.  Patient became 
voluntary during hospital stay.  Patient discharged today independently and 
voluntarily.



ATTITUDE AT TIME OF DISCHARGE:  The patients attitude was positive at time of 
discharge, and patient reports looking forward to discharging today.  The 
patient reports he feels safe to discharge, is no longer a danger to himself or 
others, is in stable condition, and contracts for safety.  Patient states he 
will continue medications as prescribed, and establish medication management 
treatment with an outpatient provider after discharge.  Patient reports he 
understands the information that has been provided to him, and he understands, 
accepts, and agrees to psychotropic medications.  Patient describes internal 
protective factors as the coping skills he has learned while hospitalized here, 
and he plans to continue to practice these coping skills after discharge.  



FAMILY MEETING: This NP, care coordinator, and patient had a conference call 
with patient's mother at time of discharge.  Patient's mother reports patient 
has a safe discharge plan and is safe to discharge today.



LABS AND STUDIES:  No pending labs or studies.



ADVANCED DIRECTIVES:  There were no advance directives on file, and the patient 
was Full Code during this hospitalization.





Job #:  [id number]

MTDD

## 2018-09-24 NOTE — ASMTBHDC
Notes

 

Note:                         

Notes:

CC reached out to [facility] and confirmed client's IOP Intake time and date, etc.





Follow up with:



[facility]

[address] [address]

Phone: [phone]

fax: [phone]

IOP Intake - Monday September 24th (09/24/18) at 7pm**  



**Please bring ID and Insurance information.**  



______________________________________________



Additional Resources: 



[facility]  Meetings

[email or web address]





Referrals:



[facility]

[address] [place] [address]

Phone: [phone]



 [facility]

[address] [place] [address]

Phone: [phone]





Danika Stack LPC, CACIII 

[facility]

[address]

[place] [address]

Phone: [phone]





Mikey Fernández PsyD

[address] [place] [address]

Phone: [phone]





Barbara Goyal - Therapist

[address]

[place] [address]

Phone: [phone]





Fernando Fernández MD

[address] [place] [address]

Phone: [phone]





Alecia Del Valle - Psychiatric Nurse

[address]

[place] [address]

Phone: [phone]

 

Date Signed:  09/24/2018 11:27 AM

Electronically Signed By:Gibran Montes

## 2018-11-07 ENCOUNTER — HOSPITAL ENCOUNTER (EMERGENCY)
Dept: HOSPITAL 80 - FED | Age: 23
Discharge: HOME | End: 2018-11-07
Payer: COMMERCIAL

## 2018-11-07 VITALS — SYSTOLIC BLOOD PRESSURE: 108 MMHG | DIASTOLIC BLOOD PRESSURE: 84 MMHG

## 2018-11-07 DIAGNOSIS — J02.0: Primary | ICD-10-CM

## 2021-06-01 NOTE — EDPHY
H & P


Stated Complaint: sore throat


Time Seen by Provider: 11/07/18 16:25


HPI/ROS: 


HPI:  This is a 23-year-old male who presents with





Chief Complaint:  Sore throat





Location:  Throat


Quality:  Sore


Duration:  2 days


Signs and Symptoms: + subjective fever, + nausea, no vomiting, no diarrhea, no 

urinary symptoms, no chest pain, no shortness of breath, no wheezing, no cough, 

+ sore throat, no neck stiffness, no joint pain,+ swollen glands, no ear pain, 

no rash, + body aches


Timing:  Sudden onset


Severity:  Moderate


Context:  Patient is a student, presents with sudden onset 2 days ago sore 

throat, subjective fever, swollen glands, body aches.  Patient reports that he 

does not have cough.  Did have some nausea but denies any vomiting.  Notes that 

he is eating and drinking without difficulty.  Denies abdominal pain, neck 

stiffness, headache.  Took ibuprofen yesterday but none today. 


Modifying Factors:  See above





Comment: 








ROS:   A comprehensive 10 system review of systems is otherwise negative aside 

from elements mentioned in the history of present illness. 





MEDICAL/SURGICAL/SOCIAL HISTORY: 


Medical history:  Depression


Surgical history:  Denies


Social history:  Never smoked. Family history noncontributory.











CONSTITUTIONAL:  Ill but nontoxic-appearing young adult white male, awake and 

alert, no obvious distress


HEENT: Atraumatic and normocephalic, PERRL, EOMI.  Nares patent; no rhinorrhea;

  no nasal mucosal edema. Tympanic membranes clear. Oropharynx clear, tonsils 1

+ with moderate erythema and left white exudate; no right exudate; uvula midline

; and moist pink mucosa.  Airway patent.  Mild spotty cervical lymphadenopathy.

  No meningismus.


Cardiovascular: Normal S1/S2, regular rate, regular rhythm, without murmur rub 

or gallop.


PULMONARY/CHEST:  Symmetrical and nontender. Clear to auscultation bilaterally. 

Good air movement. No accessory muscle usage.


ABDOMEN:  Soft, nondistended, nontender, no rebound, no guarding, no peritoneal 

signs, no masses or organomegaly. No CVAT.


EXTREMITIES:  2/2 pulses, strength 5/5, no deformities, no clubbing, no 

cyanosis or edema.


NEUROLOGICAL: no focal neuro deficits.  GCS 15.


SKIN: Warm and dry, no erythema. no rash.  Good capillary refill. 


 





Source: Patient


Exam Limitations: No limitations





- Medical/Surgical History


Hx Asthma: No


Hx Chronic Respiratory Disease: No


Hx Diabetes: No


Hx Cardiac Disease: No


Hx Renal Disease: No


Hx Cirrhosis: No


Hx Alcoholism: No


Hx HIV/AIDS: No


Hx Splenectomy or Spleen Trauma: No


Other PMH: depression,





- Social History


Smoking Status: Never smoked


Constitutional: 


 Initial Vital Signs











Temperature (C)  36.8 C   11/07/18 14:07


 


Heart Rate  89   11/07/18 14:07


 


Respiratory Rate  18   11/07/18 14:07


 


Blood Pressure  108/84 H  11/07/18 14:07


 


O2 Sat (%)  94   11/07/18 14:07








 











O2 Delivery Mode               Room Air














Allergies/Adverse Reactions: 


 





No Known Allergies Allergy (Verified 11/07/18 14:07)


 








Home Medications: 














 Medication  Instructions  Recorded


 


Folic Acid [Folic Acid 1 MG (*)] 1 mg PO DAILY  tab 09/24/18


 


Multivitamins [Multivitamin (*)] 1 each PO DAILY  tab 09/24/18


 


Thiamine HCl [Vitamin B-1] 100 mg PO DAILY  tab 09/24/18


 


Amoxicillin Trihydrate [Amoxil] 500 mg PO TID 10 Days  cap 11/07/18














Medical Decision Making


ED Course/Re-evaluation: 


Vital signs reviewed and stable upon arrival.  No systemic signs.


No signs of tonsillar abscess/otitis media/sinusitis/airway compromise/

meningitis/sepsis.


Rapid strep test positive


Given amoxicillin 500 mg and Decadron 10 mg in the emergency room.


Given prescription for amoxicillin


Eating and drinking without any difficulty. 








This patient was seen under the supervision of my secondary supervising 

physician.  I evaluated care for this patient independently.  Discussed this 

patient with Dr. Gautam.  





Differential Diagnosis: 


Differential diagnosis includes but is not limited to viral syndrome, upper 

respiratory infection, strep tonsillitis, infectious mononucleosis, meningitis.








- Data Points


Laboratory Results: 


 











  11/07/18





  16:25


 


Group A Strep Screen  POSITIVE  H 





   (NEGATIVE) 











Medications Given: 


 








Discontinued Medications





Amoxicillin (Amoxicillin)  500 mg PO EDNOW ONE


   PRN Reason: Protocol


   Stop: 11/07/18 16:49


   Last Admin: 11/07/18 16:57 Dose:  500 mg


Dexamethasone (Decadron)  10 mg PO EDNOW ONE


   Stop: 11/07/18 16:51


   Last Admin: 11/07/18 16:57 Dose:  10 mg








Departure





- Departure


Disposition: Home, Routine, Self-Care


Clinical Impression: 


 Streptococcal tonsillopharyngitis





Condition: Good


Instructions:  Strep Throat (ED)


Additional Instructions: 


Take Tylenol 650 mg every 4 hours and/or Ibuprofen 600 mg every 8 hours with 

food as needed for pain/headache/fever. 


Take antibiotics as directed.  Do not skip a dose.  Take all antibiotics until 

complete. 


Consume a minimum of 8-10 glasses of water or electrolyte fluid replacement 

drinks that include Gatorade, Powerade, Pedialyte. 


Eat a bland diet for the next 48 hours and then slowly advance as tolerated. 


Rest as much as possible until you are feeling better.








Return to the ER immediately if you cannot swallow, have drooling, fevers, neck 

stiffness, cannot open your jaw, or any other symptoms that concern you.





Referrals: 


MIKKI OATES [Primary Care Provider] - As per Instructions


Stand Alone Forms:  School Excuse


Prescriptions: 


Amoxicillin Trihydrate [Amoxil] 500 mg PO TID 10 Days  cap Telephoned and left voice message for Sarah requesting call back to follow up after her recent consult with Dr. Burroughs.  Dr. Burroughs would like to see her back in clinic for follow up soon.  I did let her know I will be out of office, but have asked Dr. Burroughs's schedulers to contact her to set up appointment. Bhavya Britton RN